# Patient Record
Sex: FEMALE | Race: WHITE | NOT HISPANIC OR LATINO | Employment: STUDENT | URBAN - METROPOLITAN AREA
[De-identification: names, ages, dates, MRNs, and addresses within clinical notes are randomized per-mention and may not be internally consistent; named-entity substitution may affect disease eponyms.]

---

## 2017-02-17 ENCOUNTER — PATIENT MESSAGE (OUTPATIENT)
Dept: PODIATRY | Facility: CLINIC | Age: 27
End: 2017-02-17

## 2017-02-17 DIAGNOSIS — M21.612 BILATERAL BUNIONS: ICD-10-CM

## 2017-02-17 DIAGNOSIS — M21.611 BILATERAL BUNIONS: ICD-10-CM

## 2017-02-17 DIAGNOSIS — M79.672 FOOT PAIN, BILATERAL: Primary | ICD-10-CM

## 2017-02-17 DIAGNOSIS — M79.671 FOOT PAIN, BILATERAL: Primary | ICD-10-CM

## 2017-02-27 ENCOUNTER — PATIENT MESSAGE (OUTPATIENT)
Dept: PODIATRY | Facility: CLINIC | Age: 27
End: 2017-02-27

## 2017-03-13 ENCOUNTER — PATIENT MESSAGE (OUTPATIENT)
Dept: PODIATRY | Facility: CLINIC | Age: 27
End: 2017-03-13

## 2017-03-13 ENCOUNTER — TELEPHONE (OUTPATIENT)
Dept: PODIATRY | Facility: CLINIC | Age: 27
End: 2017-03-13

## 2017-03-13 NOTE — TELEPHONE ENCOUNTER
Left message made aware that faxed script to  [facility]  And made sure faxed was received on their end if have any question  To please call us back  [phone]

## 2017-03-13 NOTE — TELEPHONE ENCOUNTER
I talk to patient to let her know that Dr. Costa could not write a script with the name Marquise on it because on her Chart her last name is Edwige. I told the patient that she has to come in with two forms of  ID and have her name changed patient said okay.

## 2017-03-13 NOTE — TELEPHONE ENCOUNTER
Received  Phone call from pt regarding [facility] not excepting  Script due to her marriage name not is taj  And needs to be changed to  Taj in order for the insurance to except it made aware she would have to come in and  Show proper documentation   But i will make dr. Costa aware of   Script and see what she can do